# Patient Record
Sex: FEMALE | Race: WHITE | URBAN - METROPOLITAN AREA
[De-identification: names, ages, dates, MRNs, and addresses within clinical notes are randomized per-mention and may not be internally consistent; named-entity substitution may affect disease eponyms.]

---

## 2023-05-16 PROBLEM — F32.89 OTHER SPECIFIED DEPRESSIVE EPISODES: Status: ACTIVE | Noted: 2023-01-31

## 2023-05-16 PROBLEM — F41.1 GENERALIZED ANXIETY DISORDER: Status: ACTIVE | Noted: 2018-03-20

## 2023-05-16 PROBLEM — G89.29 CHRONIC NECK PAIN: Status: ACTIVE | Noted: 2017-01-01

## 2023-05-16 PROBLEM — G43.909 MIGRAINES: Status: ACTIVE | Noted: 2022-01-01

## 2023-05-16 PROBLEM — F32.81 PMDD (PREMENSTRUAL DYSPHORIC DISORDER): Status: ACTIVE | Noted: 2022-11-01

## 2023-05-16 PROBLEM — H90.12 CONDUCTIVE HEARING LOSS OF LEFT EAR WITH UNRESTRICTED HEARING OF RIGHT EAR: Status: ACTIVE | Noted: 2023-03-02

## 2023-05-16 PROBLEM — M54.2 CHRONIC NECK PAIN: Status: ACTIVE | Noted: 2017-01-01

## 2023-05-16 PROBLEM — F60.3 BORDERLINE PERSONALITY DISORDER IN ADULT (MULTI): Chronic | Status: ACTIVE | Noted: 2023-03-20

## 2023-05-16 RX ORDER — ALPRAZOLAM 0.5 MG/1
0.5 TABLET ORAL
COMMUNITY
Start: 2022-11-22 | End: 2023-05-17 | Stop reason: SDUPTHER

## 2023-05-17 ENCOUNTER — OFFICE VISIT (OUTPATIENT)
Dept: PRIMARY CARE | Facility: CLINIC | Age: 28
End: 2023-05-17
Payer: COMMERCIAL

## 2023-05-17 ENCOUNTER — APPOINTMENT (OUTPATIENT)
Dept: PRIMARY CARE | Facility: CLINIC | Age: 28
End: 2023-05-17
Payer: COMMERCIAL

## 2023-05-17 VITALS
SYSTOLIC BLOOD PRESSURE: 106 MMHG | RESPIRATION RATE: 16 BRPM | OXYGEN SATURATION: 98 % | WEIGHT: 175 LBS | BODY MASS INDEX: 27.47 KG/M2 | HEIGHT: 67 IN | DIASTOLIC BLOOD PRESSURE: 68 MMHG | TEMPERATURE: 97.8 F | HEART RATE: 74 BPM

## 2023-05-17 DIAGNOSIS — G89.29 CHRONIC NECK PAIN: ICD-10-CM

## 2023-05-17 DIAGNOSIS — M54.2 CHRONIC NECK PAIN: ICD-10-CM

## 2023-05-17 DIAGNOSIS — F41.1 GENERALIZED ANXIETY DISORDER: ICD-10-CM

## 2023-05-17 DIAGNOSIS — G43.909 MIGRAINE WITHOUT STATUS MIGRAINOSUS, NOT INTRACTABLE, UNSPECIFIED MIGRAINE TYPE: Primary | ICD-10-CM

## 2023-05-17 PROCEDURE — 1036F TOBACCO NON-USER: CPT | Performed by: FAMILY MEDICINE

## 2023-05-17 PROCEDURE — 99203 OFFICE O/P NEW LOW 30 MIN: CPT | Performed by: FAMILY MEDICINE

## 2023-05-17 RX ORDER — LAMOTRIGINE 25 MG/1
TABLET ORAL
COMMUNITY
Start: 2023-04-26 | End: 2023-05-17 | Stop reason: SDUPTHER

## 2023-05-18 ENCOUNTER — TELEPHONE (OUTPATIENT)
Dept: PRIMARY CARE | Facility: CLINIC | Age: 28
End: 2023-05-18
Payer: COMMERCIAL

## 2023-05-18 RX ORDER — CYCLOBENZAPRINE HCL 5 MG
5 TABLET ORAL NIGHTLY PRN
Qty: 30 TABLET | Refills: 0 | Status: SHIPPED | OUTPATIENT
Start: 2023-05-18 | End: 2023-06-09 | Stop reason: SDUPTHER

## 2023-05-18 RX ORDER — MELOXICAM 15 MG/1
15 TABLET ORAL DAILY
Qty: 30 TABLET | Refills: 11 | Status: SHIPPED | OUTPATIENT
Start: 2023-05-18 | End: 2023-06-09 | Stop reason: SDUPTHER

## 2023-05-18 RX ORDER — ALPRAZOLAM 0.5 MG/1
0.5 TABLET ORAL 3 TIMES DAILY PRN
Qty: 30 TABLET | Refills: 0 | Status: SHIPPED | OUTPATIENT
Start: 2023-05-18 | End: 2023-12-27 | Stop reason: ALTCHOICE

## 2023-05-18 RX ORDER — LAMOTRIGINE 25 MG/1
25 TABLET ORAL 2 TIMES DAILY
Qty: 60 TABLET | Refills: 11 | Status: SHIPPED | OUTPATIENT
Start: 2023-05-18 | End: 2023-06-09 | Stop reason: DRUGHIGH

## 2023-05-18 NOTE — TELEPHONE ENCOUNTER
Pt called to check on her refills for xanax and lamictal and she said Dr. Castrejon told her about two new medications she wanted her to start.  Pt saw Dr. Castrejon on 5/17/23.  Please send to [facility] on [address]

## 2023-05-23 ASSESSMENT — ENCOUNTER SYMPTOMS
NERVOUS/ANXIOUS: 1
NECK PAIN: 1

## 2023-06-06 ENCOUNTER — TELEPHONE (OUTPATIENT)
Dept: PRIMARY CARE | Facility: CLINIC | Age: 28
End: 2023-06-06
Payer: COMMERCIAL

## 2023-06-06 NOTE — TELEPHONE ENCOUNTER
Pt called asking for a referral for acupuncture to address her migraines. She is asking for the referral to be faxed to [facility] to [phone]. She is aware we all share Epic but would like it faxed anyway. Please advise.

## 2023-06-08 ENCOUNTER — APPOINTMENT (OUTPATIENT)
Dept: PRIMARY CARE | Facility: CLINIC | Age: 28
End: 2023-06-08
Payer: COMMERCIAL

## 2023-06-09 ENCOUNTER — OFFICE VISIT (OUTPATIENT)
Dept: PRIMARY CARE | Facility: CLINIC | Age: 28
End: 2023-06-09
Payer: COMMERCIAL

## 2023-06-09 VITALS
WEIGHT: 173 LBS | HEART RATE: 68 BPM | DIASTOLIC BLOOD PRESSURE: 64 MMHG | SYSTOLIC BLOOD PRESSURE: 104 MMHG | RESPIRATION RATE: 16 BRPM | TEMPERATURE: 97.8 F | BODY MASS INDEX: 27.15 KG/M2 | HEIGHT: 67 IN
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
## 2023-06-09 DIAGNOSIS — G89.29 CHRONIC NECK PAIN: ICD-10-CM

## 2023-06-09 DIAGNOSIS — G43.909 MIGRAINE WITHOUT STATUS MIGRAINOSUS, NOT INTRACTABLE, UNSPECIFIED MIGRAINE TYPE: ICD-10-CM

## 2023-06-09 DIAGNOSIS — M54.2 CHRONIC NECK PAIN: ICD-10-CM

## 2023-06-09 DIAGNOSIS — J30.2 SEASONAL ALLERGIES: Primary | ICD-10-CM

## 2023-06-09 PROCEDURE — 99213 OFFICE O/P EST LOW 20 MIN: CPT | Performed by: FAMILY MEDICINE

## 2023-06-09 PROCEDURE — 1036F TOBACCO NON-USER: CPT | Performed by: FAMILY MEDICINE

## 2023-06-09 RX ORDER — MOMETASONE FUROATE 1 MG/G
CREAM TOPICAL DAILY
Qty: 15 G | Refills: 3 | Status: SHIPPED | OUTPATIENT
Start: 2023-06-09 | End: 2023-06-30

## 2023-06-09 RX ORDER — CETIRIZINE HYDROCHLORIDE 10 MG/1
10 TABLET ORAL DAILY
Qty: 90 TABLET | Refills: 3 | Status: SHIPPED | OUTPATIENT
Start: 2023-06-09 | End: 2023-12-27 | Stop reason: ALTCHOICE

## 2023-06-09 RX ORDER — CYCLOBENZAPRINE HCL 5 MG
5 TABLET ORAL 3 TIMES DAILY
Qty: 90 TABLET | Refills: 0 | Status: SHIPPED | OUTPATIENT
Start: 2023-06-09 | End: 2023-08-08

## 2023-06-09 RX ORDER — LAMOTRIGINE 100 MG/1
100 TABLET ORAL DAILY
Qty: 90 TABLET | Refills: 3 | Status: SHIPPED | OUTPATIENT
Start: 2023-06-09 | End: 2023-12-27 | Stop reason: ALTCHOICE

## 2023-06-09 RX ORDER — MELOXICAM 15 MG/1
15 TABLET ORAL DAILY
Qty: 90 TABLET | Refills: 3 | Status: SHIPPED | OUTPATIENT
Start: 2023-06-09 | End: 2023-12-27 | Stop reason: ALTCHOICE

## 2023-06-09 NOTE — PROGRESS NOTES
"Subjective   Patient ID: Prisca Keene is a 27 y.o. female who presents for Neck Pain (Pain and stiffness radiating down to lower back.) and Insect Bite.  Was on vacation.   Also, had some insect bites that are inflamed on her body.     Review of Systems As per HPI    Objective   /64 (BP Location: Left arm, Patient Position: Sitting)   Pulse 68   Temp 36.6 °C (97.8 °F)   Resp 16   Ht 1.702 m (5' 7\")   Wt 78.5 kg (173 lb)   LMP 04/21/2023   BMI 27.10 kg/m²     Physical Exam  Constitutional:       Appearance: Normal appearance.   HENT:      Head: Normocephalic and atraumatic.      Right Ear: Tympanic membrane normal.      Left Ear: Tympanic membrane normal.      Nose: Nose normal.      Mouth/Throat:      Mouth: Mucous membranes are moist.   Cardiovascular:      Rate and Rhythm: Normal rate and regular rhythm.   Pulmonary:      Effort: Pulmonary effort is normal.      Breath sounds: Normal breath sounds.   Musculoskeletal:         General: Tenderness (neck) present.   Skin:     Findings: Rash (bug bites on arms and right buttock) present.   Neurological:      Mental Status: She is alert.         Assessment/Plan   Problem List Items Addressed This Visit       Migraines    Relevant Medications    lamoTRIgine (LaMICtal) 100 mg tablet    Chronic neck pain    Relevant Medications    cyclobenzaprine (Flexeril) 5 mg tablet    lamoTRIgine (LaMICtal) 100 mg tablet    meloxicam (Mobic) 15 mg tablet    Seasonal allergies - Primary    Relevant Medications    cetirizine (ZyrTEC) 10 mg tablet     Other Visit Diagnoses       Insect bite, unspecified site, initial encounter        Relevant Medications    mometasone (Elocon) 0.1 % cream               "

## 2023-11-16 ENCOUNTER — TELEPHONE (OUTPATIENT)
Dept: PRIMARY CARE | Facility: CLINIC | Age: 28
End: 2023-11-16
Payer: COMMERCIAL

## 2023-11-16 DIAGNOSIS — R39.9 UTI SYMPTOMS: Primary | ICD-10-CM

## 2023-11-16 RX ORDER — CIPROFLOXACIN 500 MG/1
500 TABLET ORAL 2 TIMES DAILY
Qty: 10 TABLET | Refills: 0 | Status: SHIPPED | OUTPATIENT
Start: 2023-11-16 | End: 2023-11-21

## 2023-11-16 NOTE — TELEPHONE ENCOUNTER
Patient may have antibiotics. If the infection/symptoms do not clear after treatment, must be seen and culture the urine.

## 2023-11-16 NOTE — TELEPHONE ENCOUNTER
Pt called and stated that she believes she has a UTI and is leaving for the weekend. She would like to have something called in if possible Please send to  and [address].

## 2023-12-27 ENCOUNTER — OFFICE VISIT (OUTPATIENT)
Dept: PRIMARY CARE | Facility: CLINIC | Age: 28
End: 2023-12-27
Payer: COMMERCIAL

## 2023-12-27 VITALS
WEIGHT: 158 LBS | OXYGEN SATURATION: 97 % | DIASTOLIC BLOOD PRESSURE: 74 MMHG | HEART RATE: 74 BPM | HEIGHT: 67 IN | TEMPERATURE: 97.8 F | SYSTOLIC BLOOD PRESSURE: 112 MMHG | BODY MASS INDEX: 24.8 KG/M2 | RESPIRATION RATE: 16 BRPM

## 2023-12-27 DIAGNOSIS — L70.0 ACNE VULGARIS: Primary | ICD-10-CM

## 2023-12-27 DIAGNOSIS — Z00.00 HEALTHCARE MAINTENANCE: ICD-10-CM

## 2023-12-27 PROCEDURE — 99395 PREV VISIT EST AGE 18-39: CPT | Performed by: FAMILY MEDICINE

## 2023-12-27 PROCEDURE — 1036F TOBACCO NON-USER: CPT | Performed by: FAMILY MEDICINE

## 2023-12-27 PROCEDURE — 93000 ELECTROCARDIOGRAM COMPLETE: CPT | Performed by: FAMILY MEDICINE

## 2023-12-27 RX ORDER — MINOCYCLINE HYDROCHLORIDE 50 MG/1
50 CAPSULE ORAL 2 TIMES DAILY
Qty: 60 CAPSULE | Refills: 5 | Status: SHIPPED | OUTPATIENT
Start: 2023-12-27 | End: 2024-06-24

## 2023-12-27 ASSESSMENT — ENCOUNTER SYMPTOMS
CARDIOVASCULAR NEGATIVE: 1
ROS SKIN COMMENTS: ACNE
RESPIRATORY NEGATIVE: 1
ENDOCRINE NEGATIVE: 1
PSYCHIATRIC NEGATIVE: 1
EYES NEGATIVE: 1
NEUROLOGICAL NEGATIVE: 1
GASTROINTESTINAL NEGATIVE: 1
CONSTITUTIONAL NEGATIVE: 1

## 2023-12-27 ASSESSMENT — PATIENT HEALTH QUESTIONNAIRE - PHQ9
2. FEELING DOWN, DEPRESSED OR HOPELESS: NOT AT ALL
SUM OF ALL RESPONSES TO PHQ9 QUESTIONS 1 AND 2: 0
1. LITTLE INTEREST OR PLEASURE IN DOING THINGS: NOT AT ALL

## 2023-12-27 ASSESSMENT — COLUMBIA-SUICIDE SEVERITY RATING SCALE - C-SSRS
6. HAVE YOU EVER DONE ANYTHING, STARTED TO DO ANYTHING, OR PREPARED TO DO ANYTHING TO END YOUR LIFE?: NO
2. HAVE YOU ACTUALLY HAD ANY THOUGHTS OF KILLING YOURSELF?: NO
1. IN THE PAST MONTH, HAVE YOU WISHED YOU WERE DEAD OR WISHED YOU COULD GO TO SLEEP AND NOT WAKE UP?: NO

## 2023-12-27 NOTE — PROGRESS NOTES
"Subjective     Patient ID: Prisca Keene is a 27 y.o. female who presents for Annual Exam.    HPI  Has been getting acne on her chin line. Both sides of face.   Review of Systems   Constitutional: Negative.    HENT: Negative.     Eyes: Negative.    Respiratory: Negative.     Cardiovascular: Negative.    Gastrointestinal: Negative.    Endocrine: Negative.    Genitourinary: Negative.    Skin:         Acne     Neurological: Negative.    Psychiatric/Behavioral: Negative.         Objective     Vitals:    12/27/23 1203   BP: 112/74   BP Location: Left arm   Patient Position: Sitting   Pulse: 74   Resp: 16   Temp: 36.6 °C (97.8 °F)   SpO2: 97%   Weight: 71.7 kg (158 lb)   Height: 1.702 m (5' 7\")        Current Outpatient Medications   Medication Instructions    minocycline 50 mg, oral, 2 times daily    mometasone (Elocon) 0.1 % cream Topical, Daily        Physical Exam  Constitutional:       General: She is not in acute distress.     Appearance: Normal appearance.   HENT:      Head: Normocephalic and atraumatic.      Right Ear: Tympanic membrane normal.      Left Ear: Tympanic membrane normal.      Nose: Nose normal.      Mouth/Throat:      Pharynx: Oropharynx is clear.   Eyes:      Conjunctiva/sclera: Conjunctivae normal.      Pupils: Pupils are equal, round, and reactive to light.   Neck:      Vascular: No carotid bruit.   Cardiovascular:      Rate and Rhythm: Normal rate and regular rhythm.      Heart sounds: Normal heart sounds.   Pulmonary:      Effort: Pulmonary effort is normal.      Breath sounds: Normal breath sounds.   Abdominal:      General: Bowel sounds are normal.      Palpations: Abdomen is soft.   Musculoskeletal:      Cervical back: Neck supple. No tenderness.   Skin:     General: Skin is warm and dry.      Comments: Acne on mandible   Neurological:      General: No focal deficit present.      Mental Status: She is alert.   Psychiatric:         Mood and Affect: Mood normal.         Behavior: Behavior " normal.         Assessment/Plan   Problem List Items Addressed This Visit    None  Visit Diagnoses         Codes    Acne vulgaris    -  Primary L70.0    Relevant Medications    minocycline 50 mg capsule    Healthcare maintenance     Z00.00    Relevant Orders    ECG 12 lead (Clinic Performed)    POCT UA (nonautomated) manually resulted    CBC    Comprehensive Metabolic Panel    Lipid Panel

## 2024-01-24 ENCOUNTER — APPOINTMENT (OUTPATIENT)
Dept: PRIMARY CARE | Facility: CLINIC | Age: 29
End: 2024-01-24
Payer: COMMERCIAL

## 2024-01-29 DIAGNOSIS — Z00.00 HEALTHCARE MAINTENANCE: ICD-10-CM

## 2024-01-29 DIAGNOSIS — L70.0 ACNE VULGARIS: Primary | ICD-10-CM

## 2024-01-29 RX ORDER — DROSPIRENONE AND ETHINYL ESTRADIOL 0.02-3(28)
1 KIT ORAL DAILY
Qty: 28 TABLET | Refills: 12 | Status: SHIPPED | OUTPATIENT
Start: 2024-01-29 | End: 2025-01-28

## 2025-01-16 ENCOUNTER — APPOINTMENT (OUTPATIENT)
Facility: CLINIC | Age: 30
End: 2025-01-16
Payer: COMMERCIAL

## 2025-01-16 VITALS
RESPIRATION RATE: 16 BRPM | DIASTOLIC BLOOD PRESSURE: 74 MMHG | TEMPERATURE: 97.8 F | BODY MASS INDEX: 28.25 KG/M2 | HEIGHT: 67 IN | HEART RATE: 68 BPM | WEIGHT: 180 LBS | SYSTOLIC BLOOD PRESSURE: 112 MMHG

## 2025-01-16 DIAGNOSIS — Z00.00 HEALTHCARE MAINTENANCE: ICD-10-CM

## 2025-01-16 DIAGNOSIS — G89.29 CHRONIC NECK PAIN: Primary | ICD-10-CM

## 2025-01-16 DIAGNOSIS — M54.2 CHRONIC NECK PAIN: Primary | ICD-10-CM

## 2025-01-16 DIAGNOSIS — M25.562 ACUTE PAIN OF LEFT KNEE: ICD-10-CM

## 2025-01-16 PROBLEM — F60.3 BORDERLINE PERSONALITY DISORDER IN ADULT (MULTI): Chronic | Status: RESOLVED | Noted: 2023-03-20 | Resolved: 2025-01-16

## 2025-01-16 PROCEDURE — 3008F BODY MASS INDEX DOCD: CPT | Performed by: FAMILY MEDICINE

## 2025-01-16 PROCEDURE — 99213 OFFICE O/P EST LOW 20 MIN: CPT | Performed by: FAMILY MEDICINE

## 2025-01-16 NOTE — PROGRESS NOTES
"Subjective     Patient ID: Prisca Keene is a 29 y.o. female who presents for Knee Pain (Left knee pain x 2 months aggravated with activities.).  Knee Pain   The incident occurred more than 1 week ago. There was no injury mechanism. The pain is present in the left knee. The pain is at a severity of 4/10. The pain is moderate. The pain has been Intermittent since onset. The symptoms are aggravated by movement.    Would like an adjustment today as well.      Review of Systems   Constitutional: Negative.    HENT: Negative.     Eyes: Negative.    Respiratory: Negative.     Cardiovascular: Negative.    Gastrointestinal: Negative.    Endocrine: Negative.    Genitourinary: Negative.    Musculoskeletal:  Positive for arthralgias and back pain.   Skin: Negative.    Neurological: Negative.    Psychiatric/Behavioral: Negative.         Objective     Vitals:    01/16/25 1440   BP: 112/74   BP Location: Left arm   Patient Position: Sitting   Pulse: 68   Resp: 16   Temp: 36.6 °C (97.8 °F)   TempSrc: Temporal   Weight: 81.6 kg (180 lb)   Height: 1.702 m (5' 7\")        Current Outpatient Medications   Medication Instructions    drospirenone-ethinyl estradioL (Johanna, 28,) 3-0.02 mg tablet 1 tablet, oral, Daily    mometasone (Elocon) 0.1 % cream Topical, Daily        Physical Exam  Constitutional:       Appearance: Normal appearance.   HENT:      Head: Normocephalic and atraumatic.      Right Ear: Tympanic membrane normal.      Left Ear: Tympanic membrane normal.      Nose: Nose normal.      Mouth/Throat:      Mouth: Mucous membranes are moist.   Cardiovascular:      Rate and Rhythm: Normal rate and regular rhythm.   Pulmonary:      Effort: Pulmonary effort is normal.      Breath sounds: Normal breath sounds.   Musculoskeletal:         General: Tenderness present.   Neurological:      Mental Status: She is alert.         Assessment/Plan   Diagnoses and all orders for this visit:  Chronic neck pain  Comments:  OMT today  Acute pain of " left knee  -     Referral to Sports Medicine; Future  Healthcare maintenance  -     CBC; Future  -     Comprehensive Metabolic Panel; Future  -     Lipid Panel; Future

## 2025-01-22 ASSESSMENT — ENCOUNTER SYMPTOMS
CARDIOVASCULAR NEGATIVE: 1
PSYCHIATRIC NEGATIVE: 1
BACK PAIN: 1
CONSTITUTIONAL NEGATIVE: 1
NEUROLOGICAL NEGATIVE: 1
ENDOCRINE NEGATIVE: 1
ARTHRALGIAS: 1
EYES NEGATIVE: 1
RESPIRATORY NEGATIVE: 1
GASTROINTESTINAL NEGATIVE: 1

## 2025-01-31 ENCOUNTER — HOSPITAL ENCOUNTER (OUTPATIENT)
Dept: RADIOLOGY | Facility: CLINIC | Age: 30
Discharge: HOME | End: 2025-01-31
Payer: COMMERCIAL

## 2025-01-31 ENCOUNTER — OFFICE VISIT (OUTPATIENT)
Dept: ORTHOPEDIC SURGERY | Facility: CLINIC | Age: 30
End: 2025-01-31
Payer: COMMERCIAL

## 2025-01-31 DIAGNOSIS — M22.42 CHONDROMALACIA OF LEFT PATELLA: Primary | ICD-10-CM

## 2025-01-31 DIAGNOSIS — M25.562 ACUTE PAIN OF LEFT KNEE: ICD-10-CM

## 2025-01-31 PROCEDURE — 1036F TOBACCO NON-USER: CPT | Performed by: PHYSICIAN ASSISTANT

## 2025-01-31 PROCEDURE — 99203 OFFICE O/P NEW LOW 30 MIN: CPT | Performed by: PHYSICIAN ASSISTANT

## 2025-01-31 PROCEDURE — 99213 OFFICE O/P EST LOW 20 MIN: CPT | Performed by: PHYSICIAN ASSISTANT

## 2025-01-31 PROCEDURE — 73564 X-RAY EXAM KNEE 4 OR MORE: CPT | Mod: LT

## 2025-01-31 RX ORDER — MELOXICAM 15 MG/1
15 TABLET ORAL DAILY
Qty: 30 TABLET | Refills: 2 | Status: SHIPPED | OUTPATIENT
Start: 2025-01-31 | End: 2025-05-01

## 2025-01-31 ASSESSMENT — PAIN DESCRIPTION - DESCRIPTORS: DESCRIPTORS: PRESSURE;SHARP

## 2025-01-31 ASSESSMENT — PAIN SCALES - GENERAL: PAINLEVEL_OUTOF10: 6

## 2025-01-31 ASSESSMENT — PAIN - FUNCTIONAL ASSESSMENT: PAIN_FUNCTIONAL_ASSESSMENT: 0-10

## 2025-01-31 NOTE — PROGRESS NOTES
Subjective    Patient ID: Prisca Keene is a 29 y.o. female.    Chief Complaint: Pain of the Left Knee (NPV )           HPI:  Prisca Keene is a 29 y.o. female who presents today for evaluation of her left knee.  She states about 6 months ago she had an episode when sitting that she felt as if her knee was going to lock in place.  She has not had this happen since.  She now notes the presence of pain over the anterior medial aspect of her knee that occurs with certain yoga poses and when doing lunges.  No specific injury or trauma that she could recall.  She has not had any swelling or feelings of instability.  She tries to avoid Tylenol and over-the-counter NSAIDs.  No prior history of surgery to either knee.  Her right knee is currently asymptomatic.    ROS  Constitutional: No fever, no chills, not feeling tired, no recent weight gain and no recent weight loss  ENT: No nosebleeds  Cardiovascular: No chest pain  Respiratory: No shortness of breath and no cough  Gastrointestinal: No abdominal pain, no nausea, no diarrhea, and no vomiting  Musculoskeletal: No arthralgias  Integumentary: No rashes and no skin lesions  Neurological: No headache  Psychiatric: No sleep disturbances no depression  Endocrine: No muscle weakness and no muscle cramps  Hematologic/lymphatic: No swelling glands and no tendency for easy bruising    History reviewed. No pertinent past medical history.     History reviewed. No pertinent surgical history.       Current Outpatient Medications:     drospirenone-ethinyl estradioL (Johanna, 28,) 3-0.02 mg tablet, Take 1 tablet by mouth once daily., Disp: 28 tablet, Rfl: 12    meloxicam (Mobic) 15 mg tablet, Take 1 tablet (15 mg) by mouth once daily., Disp: 30 tablet, Rfl: 2    mometasone (Elocon) 0.1 % cream, Apply topically once daily for 21 days., Disp: 15 g, Rfl: 3     Allergies   Allergen Reactions    Mold Unknown    Cat Dander Itching, Rash, Runny nose and Wheezing    Penicillins Dermatitis, Hives,  Itching and Rash     fever    Other reaction(s): UNKNOWN    fever   Other reaction(s): UNKNOWN   fever   Other reaction(s): UNKNOWN   fever   Other reaction(s): UNKNOWN   fever    Other reaction(s): UNKNOWN   fever   Other reaction(s): UNKNOWN   fever        Social Connections: Not on File (9/14/2024)    Received from [facility]    Social Connections     Connectedness: 0          Objective   29-year-old female well appearing in no acute distress. Alert and oriented ×3.  Skin intact bilateral lower extremities.   Normal tandem gait. Coordination and balance intact.  Bilateral lower extremity compartments supple.  5 out of 5 distal motor strength bilaterally.  L4 through S1 sensation intact bilaterally.  2+ DP/PT pulses bilaterally.  No effusion to either knee.  Active range of motion of both knees is 0 to 130 degrees.  No significant crepitus.  She can perform an isometric quad contraction and straight leg raise bilaterally.  Both knees are ligamentously stable with a negative varus and valgus stress.  Negative Lachman's as well.  Negative patellar apprehension.  Some mild tenderness over the medial and lateral patella facets of the left knee    Image Results:  X-rays of the left knee taken today in the office were reviewed.  These were negative for fracture or dislocation.  Joint space well-maintained.  There is a small osteophyte noted on the lateral aspect of the patella, best appreciated on the sunrise view      Assessment/Plan   Encounter Diagnoses:  Chondromalacia of left patella    Acute pain of left knee    Orders Placed This Encounter    XR knee left 4+ views    Referral to Physical Therapy    meloxicam (Mobic) 15 mg tablet       We discussed a course of conservative care which includes a trial of oral anti-inflammatory medications and physical therapy.  She is going to begin meloxicam 15 mg once a day for the next 2 weeks.  We discussed avoidance of open chain knee extension and focusing more on close chain  exercises but avoid deep squats and lunges.  We discussed the role that injection therapies can also play.  Will see her back as needed.    This office note was dictated using Dragon voice to text software and was not proofread for spelling or grammatical errors

## 2025-02-28 ENCOUNTER — APPOINTMENT (OUTPATIENT)
Facility: CLINIC | Age: 30
End: 2025-02-28
Payer: COMMERCIAL

## 2025-03-03 ENCOUNTER — APPOINTMENT (OUTPATIENT)
Dept: PHYSICAL THERAPY | Facility: CLINIC | Age: 30
End: 2025-03-03

## 2025-03-11 ENCOUNTER — APPOINTMENT (OUTPATIENT)
Dept: INFECTIOUS DISEASES | Facility: CLINIC | Age: 30
End: 2025-03-11
Payer: COMMERCIAL

## 2025-03-14 ENCOUNTER — APPOINTMENT (OUTPATIENT)
Dept: PHYSICAL THERAPY | Facility: CLINIC | Age: 30
End: 2025-03-14

## 2025-03-19 ENCOUNTER — APPOINTMENT (OUTPATIENT)
Dept: PHYSICAL THERAPY | Facility: CLINIC | Age: 30
End: 2025-03-19

## 2025-03-24 ENCOUNTER — APPOINTMENT (OUTPATIENT)
Dept: ORTHOPEDIC SURGERY | Facility: HOSPITAL | Age: 30
End: 2025-03-24
Payer: COMMERCIAL

## 2025-04-14 ENCOUNTER — APPOINTMENT (OUTPATIENT)
Facility: CLINIC | Age: 30
End: 2025-04-14
Payer: COMMERCIAL

## 2025-05-11 ENCOUNTER — OFFICE VISIT (OUTPATIENT)
Dept: URGENT CARE | Age: 30
End: 2025-05-11
Payer: COMMERCIAL

## 2025-05-11 VITALS
HEIGHT: 67 IN | OXYGEN SATURATION: 98 % | HEART RATE: 66 BPM | WEIGHT: 175 LBS | RESPIRATION RATE: 18 BRPM | SYSTOLIC BLOOD PRESSURE: 108 MMHG | TEMPERATURE: 98.3 F | BODY MASS INDEX: 27.47 KG/M2 | DIASTOLIC BLOOD PRESSURE: 70 MMHG

## 2025-05-11 DIAGNOSIS — R05.1 ACUTE COUGH: ICD-10-CM

## 2025-05-11 DIAGNOSIS — H65.192 OTHER NON-RECURRENT ACUTE NONSUPPURATIVE OTITIS MEDIA OF LEFT EAR: Primary | ICD-10-CM

## 2025-05-11 DIAGNOSIS — Z20.822 LAB TEST NEGATIVE FOR COVID-19 VIRUS: ICD-10-CM

## 2025-05-11 DIAGNOSIS — J01.90 ACUTE RHINOSINUSITIS: ICD-10-CM

## 2025-05-11 PROBLEM — H72.92 TYMPANIC MEMBRANE PERFORATION, LEFT: Status: ACTIVE | Noted: 2023-03-02

## 2025-05-11 LAB
POC CORONAVIRUS SARS-COV-2 PCR: NEGATIVE
POC HUMAN RHINOVIRUS PCR: NEGATIVE
POC INFLUENZA A VIRUS PCR: NEGATIVE
POC INFLUENZA B VIRUS PCR: NEGATIVE
POC RESPIRATORY SYNCYTIAL VIRUS PCR: NEGATIVE

## 2025-05-11 PROCEDURE — 99204 OFFICE O/P NEW MOD 45 MIN: CPT | Performed by: PHYSICIAN ASSISTANT

## 2025-05-11 PROCEDURE — 3008F BODY MASS INDEX DOCD: CPT | Performed by: PHYSICIAN ASSISTANT

## 2025-05-11 PROCEDURE — 87631 RESP VIRUS 3-5 TARGETS: CPT | Performed by: PHYSICIAN ASSISTANT

## 2025-05-11 PROCEDURE — 1036F TOBACCO NON-USER: CPT | Performed by: PHYSICIAN ASSISTANT

## 2025-05-11 RX ORDER — DOXYCYCLINE HYCLATE 100 MG
100 TABLET ORAL 2 TIMES DAILY
Qty: 14 TABLET | Refills: 0 | Status: SHIPPED | OUTPATIENT
Start: 2025-05-11 | End: 2025-05-18

## 2025-05-11 RX ORDER — FLUTICASONE PROPIONATE 50 MCG
1 SPRAY, SUSPENSION (ML) NASAL DAILY
Qty: 16 G | Refills: 0 | Status: SHIPPED | OUTPATIENT
Start: 2025-05-11 | End: 2026-05-11

## 2025-05-11 ASSESSMENT — ENCOUNTER SYMPTOMS
COUGH: 1
EYES NEGATIVE: 1
FEVER: 0
CARDIOVASCULAR NEGATIVE: 1
GASTROINTESTINAL NEGATIVE: 1
SHORTNESS OF BREATH: 0
SORE THROAT: 1
WHEEZING: 0
SINUS PRESSURE: 1
RHINORRHEA: 1

## 2025-05-11 ASSESSMENT — PAIN SCALES - GENERAL: PAINLEVEL_OUTOF10: 4

## 2025-05-11 NOTE — PATIENT INSTRUCTIONS
You are being treated with antibiotics for ear infection of your left ear.  Please take antibiotics as prescribed.   You should schedule follow up with ENT.     Tricks to manage your symptoms:   -Drink plenty of water to stay hydrated.  -Get plenty of rest.   -Ibuprofen/NSAIDS (Advil® or Motrin®) or acetaminophen (Tylenol®) may be used for temperature and/or discomfort.  Do not exceed the recommended daily amount as written on the bottle. Do not take ibuprofen or other NSAIDS if you are on a blood thinner, have hypertension, hear failure, or kidney disease..  Please contact your PCP if you have questions..  -You can take a daily non-drowsy allergy medication such as Allergra (Fexofendadine), Zyrtec (Cetirizine) or Claritin (Loratadine) to decrease sinus and nasal inflammation and drainage.  -If you have elevated blood pressure you should avoid products with products that contain phenylephrine or pseudophedrine as these medications can increase your blood pressure.  -Putting a small amount of honey in tea may help with your sore throat and cough.  -Gargle with warm salt water 3 to 4 times each day. Mix 1/2 teaspoon (2.5 grams) salt with a cup (240 mL) of warm water.     -Nasal steroids spray (fluticasone or generic equivalent) 1-2 times daily as needed  -Saline nasal spray  -You can take OTC mucinex (Guaifenesin): Can take up to 1200 mg twice daily.    -Drink plenty of water. Water helps thin the mucus so your sinuses can drain more easily.   -Use a humidifier.  -inhale steam 3 to 4 times a day (for example, sit in the bathroom with the shower running).  -Apply a warm, moist washcloth to your face 3 to 4 times a day, or as directed by your caregiver.    Please seek immediate medical evaluation if you develop:   -Shortness of breath or difficulty breathing  -Trouble swallowing, trouble breathing, or shortness of breath while lying down  -You are unable to take a deep breath  -You have difficulties swallowing  -You  have chest pain   -You have heart palpitations  -You have fever > 102 F despite fever reducing medications   -You are unable to tolerate fluids for 6 hours or more  -You develop swelling and/or pain in your legs   -You feel faint, lightheaded, or dizzy  -Any other concerning symptoms    -If your symptoms are not improving over the next 36 hours, please return to the clinic or see your PCP. If your symptoms worsen or you develop shortness of breath, seek emergency care immediately.

## 2025-05-11 NOTE — PROGRESS NOTES
"Subjective   Patient ID: Prisca Keene is a 29 y.o. female. They present today with a chief complaint of Cough (Sunday started to get sick with congestion cough and full of mucus ).    History of Present Illness  -c/o URI symptoms for the since last Sunday  -endorses congestion, cough, runny nose, post nasal drip, sinus pressure, and ear discomfort  -she has history of perforated TM of the L ear (surgically repaired, but then had tear noted at her follow up appt, but she has not followed up since then  -reports she feels the blowing in her left ear more than normal   -symptoms have not improved since onset  -she denies fever, CP, SOB       History provided by:  Medical records and patient      Past Medical History  Allergies as of 05/11/2025 - Reviewed 05/11/2025   Allergen Reaction Noted    Mold Unknown 05/16/2023    Cat dander Itching, Rash, Runny nose, and Wheezing 05/16/2023    Penicillins Dermatitis, Hives, Itching, and Rash 01/01/1999       Prescriptions Prior to Admission[1]     Medical History[2]    Surgical History[3]     reports that she has never smoked. She has never used smokeless tobacco.    Review of Systems  Review of Systems   Constitutional:  Negative for fever.   HENT:  Positive for congestion, ear pain, postnasal drip, rhinorrhea, sinus pressure and sore throat.    Eyes: Negative.    Respiratory:  Positive for cough. Negative for shortness of breath and wheezing.    Cardiovascular: Negative.    Gastrointestinal: Negative.    All other systems reviewed and are negative.    Objective    Vitals:    05/11/25 1138   BP: 108/70   Pulse: 66   Resp: 18   Temp: 36.8 °C (98.3 °F)   TempSrc: Oral   SpO2: 98%   Weight: 79.4 kg (175 lb)   Height: 1.702 m (5' 7\")     No LMP recorded.    Physical Exam  Vitals reviewed.   Constitutional:       General: She is not in acute distress.     Appearance: Normal appearance. She is not ill-appearing.   HENT:      Head: Normocephalic and atraumatic.      Comments: " "-tenderness to fingertap of the maxillary sinuses      Right Ear: No swelling. Tympanic membrane is injected. Tympanic membrane is not bulging.      Left Ear: No swelling. Tympanic membrane is erythematous.      Ears:      Comments: -moderate amount of ear was in the left ear partially obstructing visualization of the TM.  Visualized TM is significantly erythematous and there is a questionable perforation (which she has had noted previous)     Nose: Congestion present.      Mouth/Throat:      Lips: Pink.      Mouth: Mucous membranes are moist.      Pharynx: Uvula midline. Oropharyngeal exudate, posterior oropharyngeal erythema and postnasal drip present. No uvula swelling.      Tonsils: No tonsillar exudate or tonsillar abscesses.   Neck:      Meningeal: Brudzinski's sign and Kernig's sign absent.   Cardiovascular:      Rate and Rhythm: Normal rate and regular rhythm.      Heart sounds: Normal heart sounds.   Pulmonary:      Effort: Pulmonary effort is normal. No accessory muscle usage, prolonged expiration or respiratory distress.      Breath sounds: No decreased breath sounds, wheezing, rhonchi or rales.   Musculoskeletal:      Cervical back: Normal range of motion and neck supple.   Lymphadenopathy:      Cervical: No cervical adenopathy.   Skin:     General: Skin is warm.      Findings: No rash.   Neurological:      Mental Status: She is alert.       /70   Pulse 66   Temp 36.8 °C (98.3 °F) (Oral)   Resp 18   Ht 1.702 m (5' 7\")   Wt 79.4 kg (175 lb)   SpO2 98%   BMI 27.41 kg/m²       Procedures    Point of Care Test & Imaging Results from this visit  Results for orders placed or performed in visit on 05/11/25   POCT SPOTFIRE R/ST Panel Mini w/COVID ([facility]) manually resulted    Specimen: Swab   Result Value Ref Range    POC Sars-Cov-2 PCR Negative Negative    POC Respiratory Syncytial Virus PCR Negative Negative    POC Influenza A Virus PCR Negative Negative    POC Influenza B Virus PCR Negative " Negative    POC Human Rhinovirus PCR Negative Negative      Imaging  No results found.    Cardiology, Vascular, and Other Imaging  No other imaging results found for the past 2 days      Diagnostic study results (if any) were reviewed by Denice Cisneros PA-C.    Assessment/Plan   Allergies, medications, history, and pertinent labs/EKGs/Imaging reviewed by Denice Cisneros PA-C.     Medical Decision Making  History and clinical presentation consistent with acute rhinosinusitis complicated by acute otitis media of the left ear.  No evidence of pneumonia, mastoiditis, tonsillitis, or sepsis.  She will be started on oral antibiotics.  She was advised to follow up with ENT given her history of TM perforation in the past.  She was given referral to ENT. Encouraged additional symptom management with push fluids, rest, gargle warm salt water, use vaporizer or mist prn, apply heat to sinuses prn, and return office visit prn if symptoms persist or worsen anti-histamines, anti-tussives, anti-mucolytics, and/or flonase etc.. Advised seeking immediate emergency medical attention if symptoms fail to improve, worsen or any concerning symptoms arise. Patient voiced full understanding and agreement to plan.     We discussed with the patient our clinical thoughts at this time given the above findings and clinical assessment and we had a shared decision-making conversation in a patient-centered decision-making model on how to proceed forward. The patient was instructed on the importance of a close follow-up with Primary Care Provider and other care providers. The patient was also advised that an Urgent care diagnosis is often a preliminary impression and that definitive care is often not able to be given completley in the Urgent care setting.    Orders and Diagnoses  Diagnoses and all orders for this visit:  Other non-recurrent acute nonsuppurative otitis media of left ear  -     doxycycline (Vibra-Tabs) 100 mg tablet; Take 1 tablet  (100 mg) by mouth 2 times a day for 7 days. Take with a full glass of water and do not lie down for at least 30 minutes after.  -     fluticasone (Flonase) 50 mcg/actuation nasal spray; Administer 1 spray into each nostril once daily. Shake gently. Before first use, prime pump. After use, clean tip and replace cap.  -     Referral to ENT; Future  Acute rhinosinusitis  -     fluticasone (Flonase) 50 mcg/actuation nasal spray; Administer 1 spray into each nostril once daily. Shake gently. Before first use, prime pump. After use, clean tip and replace cap.  Acute cough  -     POCT SPOTFIRE R/ST Panel Mini w/COVID ([facility]) manually resulted  Lab test negative for COVID-19 virus      Medical Admin Record      Patient disposition: Home    Electronically signed by Denice Cisneros PA-C  5:21 PM           [1] (Not in a hospital admission)   [2] No past medical history on file.  [3] No past surgical history on file.

## 2025-08-06 ENCOUNTER — APPOINTMENT (OUTPATIENT)
Dept: OTOLARYNGOLOGY | Facility: CLINIC | Age: 30
End: 2025-08-06
Payer: COMMERCIAL